# Patient Record
Sex: MALE | Race: WHITE | Employment: UNEMPLOYED | URBAN - METROPOLITAN AREA
[De-identification: names, ages, dates, MRNs, and addresses within clinical notes are randomized per-mention and may not be internally consistent; named-entity substitution may affect disease eponyms.]

---

## 2020-09-08 PROBLEM — F17.200 SMOKER: Status: ACTIVE | Noted: 2020-09-08

## 2020-09-08 PROBLEM — Z78.9 ALCOHOL CONSUMPTION HEAVY: Status: ACTIVE | Noted: 2020-09-08

## 2020-09-08 PROBLEM — E66.9 OBESITY (BMI 30.0-34.9): Status: ACTIVE | Noted: 2020-09-08

## 2020-09-08 PROBLEM — I10 ESSENTIAL HYPERTENSION: Status: ACTIVE | Noted: 2020-09-08

## 2021-12-04 PROBLEM — R63.4 UNINTENTIONAL WEIGHT LOSS: Status: ACTIVE | Noted: 2021-12-04

## 2024-01-30 ENCOUNTER — ANESTHESIA EVENT (OUTPATIENT)
Dept: SURGERY | Facility: HOSPITAL | Age: 59
End: 2024-01-30
Payer: COMMERCIAL

## 2024-01-31 NOTE — H&P (VIEW-ONLY)
Patient ID: Demarcus Jaramillo     Chief Complaint:    Left hip pain       HPI:    Demarcus Jaramillo is a 58 year old male who presents today for left hip OA/pain. Seen in consultation with Dr Foreman and a note will be sent. Patient states pain started >2 years ago. Pain is severe and described as aching, grinding, and sharp,  and groin pain. Pain is located anteriorly. Factors that aggravate symptoms include: getting in and out of car, putting shoes and socks on activity, stair climbing, getting up from a chair.  Weight Bearing: without assist.  Patient denies any numbness, tingles, or radicular symptoms. Patient denies any signs of infection including fever or chills.  Patient states that the symptoms are getting progressively worse.  The pain is affecting their quality of life, ability to sleep at night, ability to perform activities and daily living and to ambulate.  Pain with walking and stair climbing.  They report they do walk with a limp.  Patient does note stiffness in the hip.  Patient has taken an anti-inflammatory for months.  Patient does not walk with a cane.  Patient does not take pain medications.  Patient would like to discuss surgery as he was unable to proceed previously. Now using a cane for ambulation.  Pain has continued and here to discuss surgical options.          Physical Exam:     Vital Signs:  There were no vitals taken for this visit.    Past Medical History:   Diagnosis Date   • Essential hypertension      Past Surgical History:   Procedure Laterality Date   • COLONOSCOPY       Current Outpatient Medications   Medication Sig Dispense Refill   • LOSARTAN-HYDROCHLOROTHIAZIDE 50-12.5 MG Oral Tab TAKE 1 TABLET BY MOUTH TWICE DAILY 180 tablet 0     No Known Allergies  Social History    Tobacco Use      Smoking status: Some Days        Packs/day: 1.00        Years: 30.00        Additional pack years: 0.00        Total pack years: 30.00        Types: Cigarettes        Passive exposure: Never       Smokeless tobacco: Never    Alcohol use: Yes      Alcohol/week: 3.0 standard drinks of alcohol      Types: 3 Cans of beer per week    History reviewed. No pertinent family history.    ROS:     All other pertinent positives and negatives as noted above in HPI.    Physical Exam  Vital Signs:  There were no vitals taken for this visit.  Estimated body mass index is 32.49 kg/m² as calculated from the following:    Height as of 1/8/24: 5' 9\" (1.753 m).    Weight as of 1/8/24: 220 lb (99.8 kg).    antalgic gait  without assistive device    Right hip: Limited internal (15 degrees) and external rotation (25 degrees) with pain in the groin region, Limited abduction (0-15/20) degrees, adduction (15 degrees) and flexion ( -5-95 degrees)         5/5 strength  5/5 knee flexion and extension  5/5 ankle dorsiflexion and plantarflexion  Sensation grossly intact in thigh, leg and foot  Negative Trendelenburg sign  Negative Stinchfield sign  2+ pedal pulses and brisk capillary refill  No skin rashes or lesion noted    Left hip: Limited internal (0 degrees) and external rotation (15 degrees) with pain in the groin region, Limited abduction (0-15/20) degrees, adduction (15 degrees) and flexion ( -5-95 degrees)      5/5 strength  5/5 knee flexion and extension  5/5 ankle dorsiflexion and plantarflexion  Sensation grossly intact in thigh, leg and foot  Negative Trendelenburg sign  Negative Stinchfield sign  2+ pedal pulses and brisk capillary refill  No skin rashes or lesions noted    Back:  Deformity: no  Pelvic obliquity: no  Tenderness: no        Radiographs:    Imaging was personally and individually reviewed and discussed at length with the patient:      Three views of the left hip(s) were reviewed in the office today, including AP pelvis and hip AP and lateral views.  There is severe joint space narrowing (bone-on-bone) with large osteophyte formation off the acetabulum and the head neck junction.  There is sclerosis noted in  the femoral head as well as a large cystic region with collapse and acetabulum. There is no fracture or dislocation.  No periosteal reactions or medullary lesions are seen.      MRSA/MSSA - Negative  Hgb 15.8  Alb 4.7  Cr 0.78  GFR 98        Assessment:     left hip osteoarthritis with possible AVN with collapse            Plan:       Continuation of conservative management vs. CALISTA discussed.  The patient wishes to proceed with left total hip replacement.      One or more of the conservative treatments have been tried and failed for three months or more except in special circumstances where delay of definitive care is not appropriate: non steroid anti-infammatory medication(s) and activity modification. Unlikely to provide significant relief with injections given severity.      Risk and benefits of surgery were reviewed.  Including, but not limited to, blood clots, anesthesia risk, infection, leg length discrepancy, failure of the implant, need for future surgeries, continued pain, hematoma, need for transfusion, and death, among others.  The patient understands and wishes to proceed.     The spectrum of treatment options were discussed with the patient in detail including both the nonoperative and operative treatment modalities and their respective risks and benefits.  After thorough discussion, the patient has elected to undergo surgical treatment.  The details of the surgical procedure were explained including the location of probable incisions and a description of the likely implants to be used.  Models and diagrams were used as educational resources. The patient understands the likely convalescence after surgery, as well as the rehabilitation required.  We thoroughly discussed the risks, benefits, and alternatives to surgery.  The risks include but are not limited to the risk of infection, joint stiffness, blood clots (including DVT and/or pulmonary embolus along with the risk of death), neurologic and/or  vascular injury, fracture, dislocation, nonunion, malunion, need for further surgery including hardware failure requiring revision, and continued pain.  It was explained that if tissue has been repaired or reconstructed, there is also a chance of failure which may require further management.  In addition, we have discussed the risks, including the risk of disease transmission, associated with any allograft tissue which may be utilized.  Following the completion of the discussion, the patient expressed understanding of this planned course of care, all their questions were answered and consent will be obtained preoperatively.    The risks, benefits and alternatives of surgery were discussed with the patient including, but not limited to:   You may be allergic to the medicine that you get during surgery.   The nerves or tendons around your hip may be hurt during surgery.   You may bleed more than expected, requiring blood transfusion.  You may get an infection which will require additional surgery and possibly removal of all implants to cure. Patients with diabetes or who are on certain medications to suppress the immune system are particularly at risk for infection. Patients over 40 BMI are at significant risk for infection and wound healing problems.  You could have a fracture during surgery, or within several weeks after surgery. Patients with osteopenia or osteoporosis are at increased risk for fracture during and after surgery.  You may have problems with your heart and/or kidneys. Patients with history of heart disease are at increased risk for cardiac complications and some of the medications used during and after surgery may affect kidney function.  After surgery, your hip may be stiff and painful. Your hip and knee may swell. This usually lasts about 6 weeks and may be permanent.  Your legs may not be the same length.   Your implant may get loose or move out of place causing pain.  The implant may get worn out  over time and need to be replaced.   After having surgery, you may lose your balance and be more likely to fall for a time.   You can dislocate your hip which would require an additional procedure and potentially surgery to correct.  You may get a blood clot in your leg or arm. This can cause pain and swelling, and it can stop blood from flowing where it needs to go in your body. The blood clot can break loose and travel to your lungs or brain. A blood clot in your lungs can cause chest pain, trouble breathing and possibly death. A blood clot in your brain can cause a stroke. These problems can be life-threatening.       Pain medication discussed.    Edw obs    Previous smoking and quit smoking for 6 weeks already and knows should stop for at least 6 weeks after surgery as well to minimize risk of infection and complications.   no diabetes - Last A1c value was 5.6% done 12/4/2021.  BMI - 30  no YOGESH  no hx of infections/MRSA/dental/joint  no hx of blood clots   no blood thinner  No last injection  no lumbar surgery  no allergy  No Cardiac history  No Pulmonary history       - risks, benefits and alternatives discussed  - labs reviewed and meds given including ppx abx  - proceed with left total hip arthroplasty       Diagnoses and all orders for this visit:    Primary osteoarthritis of left hip

## 2024-02-05 ENCOUNTER — ANESTHESIA (OUTPATIENT)
Dept: SURGERY | Facility: HOSPITAL | Age: 59
End: 2024-02-05
Payer: COMMERCIAL

## 2024-02-05 ENCOUNTER — HOSPITAL ENCOUNTER (OUTPATIENT)
Facility: HOSPITAL | Age: 59
Discharge: HOME HEALTH | End: 2024-02-06
Attending: ORTHOPAEDIC SURGERY | Admitting: ORTHOPAEDIC SURGERY
Payer: COMMERCIAL

## 2024-02-05 ENCOUNTER — APPOINTMENT (OUTPATIENT)
Dept: GENERAL RADIOLOGY | Facility: HOSPITAL | Age: 59
End: 2024-02-05
Attending: ORTHOPAEDIC SURGERY
Payer: COMMERCIAL

## 2024-02-05 LAB
ANION GAP SERPL CALC-SCNC: 4 MMOL/L (ref 0–18)
ANTIBODY SCREEN: NEGATIVE
BUN BLD-MCNC: 9 MG/DL (ref 9–23)
CALCIUM BLD-MCNC: 9.3 MG/DL (ref 8.5–10.1)
CHLORIDE SERPL-SCNC: 97 MMOL/L (ref 98–112)
CO2 SERPL-SCNC: 31 MMOL/L (ref 21–32)
CREAT BLD-MCNC: 0.86 MG/DL
EGFRCR SERPLBLD CKD-EPI 2021: 99 ML/MIN/1.73M2 (ref 60–?)
GLUCOSE BLD-MCNC: 109 MG/DL (ref 70–99)
OSMOLALITY SERPL CALC.SUM OF ELEC: 273 MOSM/KG (ref 275–295)
POTASSIUM SERPL-SCNC: 3.8 MMOL/L (ref 3.5–5.1)
RH BLOOD TYPE: POSITIVE
SODIUM SERPL-SCNC: 132 MMOL/L (ref 136–145)

## 2024-02-05 PROCEDURE — 86850 RBC ANTIBODY SCREEN: CPT | Performed by: ORTHOPAEDIC SURGERY

## 2024-02-05 PROCEDURE — 86901 BLOOD TYPING SEROLOGIC RH(D): CPT | Performed by: ORTHOPAEDIC SURGERY

## 2024-02-05 PROCEDURE — 97161 PT EVAL LOW COMPLEX 20 MIN: CPT

## 2024-02-05 PROCEDURE — 97116 GAIT TRAINING THERAPY: CPT

## 2024-02-05 PROCEDURE — 0SRB039 REPLACEMENT OF LEFT HIP JOINT WITH CERAMIC SYNTHETIC SUBSTITUTE, CEMENTED, OPEN APPROACH: ICD-10-PCS | Performed by: ORTHOPAEDIC SURGERY

## 2024-02-05 PROCEDURE — 76000 FLUOROSCOPY <1 HR PHYS/QHP: CPT | Performed by: ORTHOPAEDIC SURGERY

## 2024-02-05 PROCEDURE — 88304 TISSUE EXAM BY PATHOLOGIST: CPT | Performed by: ORTHOPAEDIC SURGERY

## 2024-02-05 PROCEDURE — 86900 BLOOD TYPING SEROLOGIC ABO: CPT | Performed by: ORTHOPAEDIC SURGERY

## 2024-02-05 PROCEDURE — 73501 X-RAY EXAM HIP UNI 1 VIEW: CPT | Performed by: ORTHOPAEDIC SURGERY

## 2024-02-05 PROCEDURE — 80048 BASIC METABOLIC PNL TOTAL CA: CPT | Performed by: ANESTHESIOLOGY

## 2024-02-05 PROCEDURE — 88311 DECALCIFY TISSUE: CPT | Performed by: ORTHOPAEDIC SURGERY

## 2024-02-05 DEVICE — IMPLANTABLE DEVICE: Type: IMPLANTABLE DEVICE | Site: HIP | Status: FUNCTIONAL

## 2024-02-05 DEVICE — SHELL ACET SZ G DIA58MM HIP PPS LIMIT H G7: Type: IMPLANTABLE DEVICE | Site: HIP | Status: FUNCTIONAL

## 2024-02-05 RX ORDER — CELECOXIB 200 MG/1
200 CAPSULE ORAL DAILY
COMMUNITY
Start: 2024-01-31

## 2024-02-05 RX ORDER — ACETAMINOPHEN 325 MG/1
650 TABLET ORAL EVERY 4 HOURS
COMMUNITY
Start: 2024-01-31 | End: 2024-02-14

## 2024-02-05 RX ORDER — PROCHLORPERAZINE EDISYLATE 5 MG/ML
5 INJECTION INTRAMUSCULAR; INTRAVENOUS EVERY 8 HOURS PRN
Status: DISCONTINUED | OUTPATIENT
Start: 2024-02-05 | End: 2024-02-05 | Stop reason: HOSPADM

## 2024-02-05 RX ORDER — KETAMINE HYDROCHLORIDE 50 MG/ML
INJECTION, SOLUTION INTRAMUSCULAR; INTRAVENOUS AS NEEDED
Status: DISCONTINUED | OUTPATIENT
Start: 2024-02-05 | End: 2024-02-05 | Stop reason: SURG

## 2024-02-05 RX ORDER — DIPHENHYDRAMINE HYDROCHLORIDE 50 MG/ML
25 INJECTION INTRAMUSCULAR; INTRAVENOUS ONCE AS NEEDED
Status: ACTIVE | OUTPATIENT
Start: 2024-02-05 | End: 2024-02-05

## 2024-02-05 RX ORDER — OXYCODONE HYDROCHLORIDE 5 MG/1
5 TABLET ORAL EVERY 4 HOURS PRN
Status: DISCONTINUED | OUTPATIENT
Start: 2024-02-05 | End: 2024-02-06

## 2024-02-05 RX ORDER — MELATONIN
325
Status: DISCONTINUED | OUTPATIENT
Start: 2024-02-06 | End: 2024-02-06

## 2024-02-05 RX ORDER — DEXAMETHASONE SODIUM PHOSPHATE 10 MG/ML
8 INJECTION, SOLUTION INTRAMUSCULAR; INTRAVENOUS ONCE
Status: COMPLETED | OUTPATIENT
Start: 2024-02-06 | End: 2024-02-06

## 2024-02-05 RX ORDER — FAMOTIDINE 20 MG/1
20 TABLET, FILM COATED ORAL 2 TIMES DAILY
Status: DISCONTINUED | OUTPATIENT
Start: 2024-02-05 | End: 2024-02-06

## 2024-02-05 RX ORDER — MIDAZOLAM HYDROCHLORIDE 1 MG/ML
1 INJECTION INTRAMUSCULAR; INTRAVENOUS EVERY 5 MIN PRN
Status: DISCONTINUED | OUTPATIENT
Start: 2024-02-05 | End: 2024-02-05 | Stop reason: HOSPADM

## 2024-02-05 RX ORDER — MEPERIDINE HYDROCHLORIDE 25 MG/ML
12.5 INJECTION INTRAMUSCULAR; INTRAVENOUS; SUBCUTANEOUS AS NEEDED
Status: DISCONTINUED | OUTPATIENT
Start: 2024-02-05 | End: 2024-02-05 | Stop reason: HOSPADM

## 2024-02-05 RX ORDER — TIZANIDINE 2 MG/1
2 TABLET ORAL EVERY 6 HOURS PRN
Status: DISCONTINUED | OUTPATIENT
Start: 2024-02-05 | End: 2024-02-06

## 2024-02-05 RX ORDER — HYDROCODONE BITARTRATE AND ACETAMINOPHEN 5; 325 MG/1; MG/1
2 TABLET ORAL ONCE AS NEEDED
Status: DISCONTINUED | OUTPATIENT
Start: 2024-02-05 | End: 2024-02-05 | Stop reason: HOSPADM

## 2024-02-05 RX ORDER — DOCUSATE SODIUM 100 MG/1
100 CAPSULE, LIQUID FILLED ORAL 2 TIMES DAILY
Status: DISCONTINUED | OUTPATIENT
Start: 2024-02-05 | End: 2024-02-06

## 2024-02-05 RX ORDER — ASPIRIN 81 MG/1
81 TABLET ORAL 2 TIMES DAILY
Status: DISCONTINUED | OUTPATIENT
Start: 2024-02-05 | End: 2024-02-06

## 2024-02-05 RX ORDER — ACETAMINOPHEN 325 MG/1
650 TABLET ORAL ONCE
Status: COMPLETED | OUTPATIENT
Start: 2024-02-05 | End: 2024-02-05

## 2024-02-05 RX ORDER — TRANEXAMIC ACID 10 MG/ML
1000 INJECTION, SOLUTION INTRAVENOUS ONCE
Status: COMPLETED | OUTPATIENT
Start: 2024-02-05 | End: 2024-02-05

## 2024-02-05 RX ORDER — PREGABALIN 75 MG/1
75 CAPSULE ORAL DAILY
Status: DISCONTINUED | OUTPATIENT
Start: 2024-02-05 | End: 2024-02-06

## 2024-02-05 RX ORDER — TRAMADOL HYDROCHLORIDE 50 MG/1
1 TABLET ORAL
COMMUNITY
Start: 2024-01-31

## 2024-02-05 RX ORDER — SENNOSIDES 8.6 MG
17.2 TABLET ORAL NIGHTLY
Status: DISCONTINUED | OUTPATIENT
Start: 2024-02-05 | End: 2024-02-06

## 2024-02-05 RX ORDER — HYDROMORPHONE HYDROCHLORIDE 1 MG/ML
0.8 INJECTION, SOLUTION INTRAMUSCULAR; INTRAVENOUS; SUBCUTANEOUS EVERY 2 HOUR PRN
Status: DISCONTINUED | OUTPATIENT
Start: 2024-02-05 | End: 2024-02-06

## 2024-02-05 RX ORDER — NALOXONE HYDROCHLORIDE 0.4 MG/ML
0.08 INJECTION, SOLUTION INTRAMUSCULAR; INTRAVENOUS; SUBCUTANEOUS AS NEEDED
Status: DISCONTINUED | OUTPATIENT
Start: 2024-02-05 | End: 2024-02-05 | Stop reason: HOSPADM

## 2024-02-05 RX ORDER — ONDANSETRON 2 MG/ML
4 INJECTION INTRAMUSCULAR; INTRAVENOUS EVERY 6 HOURS PRN
Status: DISCONTINUED | OUTPATIENT
Start: 2024-02-05 | End: 2024-02-06

## 2024-02-05 RX ORDER — SODIUM CHLORIDE, SODIUM LACTATE, POTASSIUM CHLORIDE, CALCIUM CHLORIDE 600; 310; 30; 20 MG/100ML; MG/100ML; MG/100ML; MG/100ML
INJECTION, SOLUTION INTRAVENOUS CONTINUOUS
Status: DISCONTINUED | OUTPATIENT
Start: 2024-02-05 | End: 2024-02-05 | Stop reason: HOSPADM

## 2024-02-05 RX ORDER — ACETAMINOPHEN 500 MG
1000 TABLET ORAL ONCE
Status: DISCONTINUED | OUTPATIENT
Start: 2024-02-05 | End: 2024-02-05 | Stop reason: HOSPADM

## 2024-02-05 RX ORDER — MIDAZOLAM HYDROCHLORIDE 1 MG/ML
INJECTION INTRAMUSCULAR; INTRAVENOUS AS NEEDED
Status: DISCONTINUED | OUTPATIENT
Start: 2024-02-05 | End: 2024-02-05 | Stop reason: SURG

## 2024-02-05 RX ORDER — DIPHENHYDRAMINE HCL 25 MG
25 CAPSULE ORAL EVERY 4 HOURS PRN
Status: DISCONTINUED | OUTPATIENT
Start: 2024-02-05 | End: 2024-02-06

## 2024-02-05 RX ORDER — CEPHALEXIN 500 MG/1
500 CAPSULE ORAL 2 TIMES DAILY
COMMUNITY
Start: 2024-01-31 | End: 2024-02-10

## 2024-02-05 RX ORDER — ACETAMINOPHEN 500 MG
1000 TABLET ORAL ONCE AS NEEDED
Status: DISCONTINUED | OUTPATIENT
Start: 2024-02-05 | End: 2024-02-05 | Stop reason: HOSPADM

## 2024-02-05 RX ORDER — OXYCODONE HYDROCHLORIDE 10 MG/1
10 TABLET ORAL EVERY 4 HOURS PRN
Status: DISCONTINUED | OUTPATIENT
Start: 2024-02-05 | End: 2024-02-06

## 2024-02-05 RX ORDER — ACETAMINOPHEN 325 MG/1
650 TABLET ORAL
Status: DISCONTINUED | OUTPATIENT
Start: 2024-02-05 | End: 2024-02-06

## 2024-02-05 RX ORDER — HYDROMORPHONE HYDROCHLORIDE 1 MG/ML
0.2 INJECTION, SOLUTION INTRAMUSCULAR; INTRAVENOUS; SUBCUTANEOUS EVERY 5 MIN PRN
Status: DISCONTINUED | OUTPATIENT
Start: 2024-02-05 | End: 2024-02-05 | Stop reason: HOSPADM

## 2024-02-05 RX ORDER — PHENYLEPHRINE HCL 10 MG/ML
VIAL (ML) INJECTION AS NEEDED
Status: DISCONTINUED | OUTPATIENT
Start: 2024-02-05 | End: 2024-02-05 | Stop reason: SURG

## 2024-02-05 RX ORDER — SODIUM CHLORIDE, SODIUM LACTATE, POTASSIUM CHLORIDE, CALCIUM CHLORIDE 600; 310; 30; 20 MG/100ML; MG/100ML; MG/100ML; MG/100ML
INJECTION, SOLUTION INTRAVENOUS CONTINUOUS
Status: DISCONTINUED | OUTPATIENT
Start: 2024-02-05 | End: 2024-02-06

## 2024-02-05 RX ORDER — ASPIRIN 81 MG/1
TABLET ORAL
COMMUNITY
Start: 2024-01-31

## 2024-02-05 RX ORDER — HYDROMORPHONE HYDROCHLORIDE 1 MG/ML
0.4 INJECTION, SOLUTION INTRAMUSCULAR; INTRAVENOUS; SUBCUTANEOUS EVERY 2 HOUR PRN
Status: DISCONTINUED | OUTPATIENT
Start: 2024-02-05 | End: 2024-02-06

## 2024-02-05 RX ORDER — POLYETHYLENE GLYCOL 3350 17 G/17G
17 POWDER, FOR SOLUTION ORAL DAILY PRN
Status: DISCONTINUED | OUTPATIENT
Start: 2024-02-05 | End: 2024-02-06

## 2024-02-05 RX ORDER — ONDANSETRON 2 MG/ML
4 INJECTION INTRAMUSCULAR; INTRAVENOUS EVERY 6 HOURS PRN
Status: DISCONTINUED | OUTPATIENT
Start: 2024-02-05 | End: 2024-02-05 | Stop reason: HOSPADM

## 2024-02-05 RX ORDER — LIDOCAINE HYDROCHLORIDE 10 MG/ML
INJECTION, SOLUTION EPIDURAL; INFILTRATION; INTRACAUDAL; PERINEURAL AS NEEDED
Status: DISCONTINUED | OUTPATIENT
Start: 2024-02-05 | End: 2024-02-05 | Stop reason: SURG

## 2024-02-05 RX ORDER — HYDROMORPHONE HYDROCHLORIDE 1 MG/ML
0.4 INJECTION, SOLUTION INTRAMUSCULAR; INTRAVENOUS; SUBCUTANEOUS EVERY 5 MIN PRN
Status: DISCONTINUED | OUTPATIENT
Start: 2024-02-05 | End: 2024-02-05 | Stop reason: HOSPADM

## 2024-02-05 RX ORDER — BISACODYL 10 MG
10 SUPPOSITORY, RECTAL RECTAL
Status: DISCONTINUED | OUTPATIENT
Start: 2024-02-05 | End: 2024-02-06

## 2024-02-05 RX ORDER — LABETALOL HYDROCHLORIDE 5 MG/ML
5 INJECTION, SOLUTION INTRAVENOUS EVERY 5 MIN PRN
Status: DISCONTINUED | OUTPATIENT
Start: 2024-02-05 | End: 2024-02-05 | Stop reason: HOSPADM

## 2024-02-05 RX ORDER — ENEMA 19; 7 G/133ML; G/133ML
1 ENEMA RECTAL ONCE AS NEEDED
Status: DISCONTINUED | OUTPATIENT
Start: 2024-02-05 | End: 2024-02-06

## 2024-02-05 RX ORDER — FAMOTIDINE 10 MG/ML
20 INJECTION, SOLUTION INTRAVENOUS 2 TIMES DAILY
Status: DISCONTINUED | OUTPATIENT
Start: 2024-02-05 | End: 2024-02-06

## 2024-02-05 RX ORDER — TRANEXAMIC ACID 10 MG/ML
1000 INJECTION, SOLUTION INTRAVENOUS ONCE
Status: DISCONTINUED | OUTPATIENT
Start: 2024-02-05 | End: 2024-02-05 | Stop reason: HOSPADM

## 2024-02-05 RX ORDER — PREGABALIN 75 MG/1
75 CAPSULE ORAL DAILY
COMMUNITY
Start: 2024-01-31

## 2024-02-05 RX ORDER — ONDANSETRON 2 MG/ML
INJECTION INTRAMUSCULAR; INTRAVENOUS AS NEEDED
Status: DISCONTINUED | OUTPATIENT
Start: 2024-02-05 | End: 2024-02-05 | Stop reason: SURG

## 2024-02-05 RX ORDER — DEXAMETHASONE SODIUM PHOSPHATE 4 MG/ML
VIAL (ML) INJECTION AS NEEDED
Status: DISCONTINUED | OUTPATIENT
Start: 2024-02-05 | End: 2024-02-05 | Stop reason: SURG

## 2024-02-05 RX ORDER — OXYCODONE HYDROCHLORIDE 5 MG/1
1 TABLET ORAL EVERY 4 HOURS PRN
COMMUNITY
Start: 2024-01-31

## 2024-02-05 RX ORDER — KETOROLAC TROMETHAMINE 30 MG/ML
30 INJECTION, SOLUTION INTRAMUSCULAR; INTRAVENOUS EVERY 6 HOURS
Status: DISCONTINUED | OUTPATIENT
Start: 2024-02-05 | End: 2024-02-06

## 2024-02-05 RX ORDER — HYDROCODONE BITARTRATE AND ACETAMINOPHEN 5; 325 MG/1; MG/1
1 TABLET ORAL ONCE AS NEEDED
Status: DISCONTINUED | OUTPATIENT
Start: 2024-02-05 | End: 2024-02-05 | Stop reason: HOSPADM

## 2024-02-05 RX ORDER — ACETAMINOPHEN 325 MG/1
TABLET ORAL
Status: COMPLETED
Start: 2024-02-05 | End: 2024-02-05

## 2024-02-05 RX ORDER — HYDROMORPHONE HYDROCHLORIDE 1 MG/ML
0.6 INJECTION, SOLUTION INTRAMUSCULAR; INTRAVENOUS; SUBCUTANEOUS EVERY 5 MIN PRN
Status: DISCONTINUED | OUTPATIENT
Start: 2024-02-05 | End: 2024-02-05 | Stop reason: HOSPADM

## 2024-02-05 RX ORDER — DIPHENHYDRAMINE HYDROCHLORIDE 50 MG/ML
12.5 INJECTION INTRAMUSCULAR; INTRAVENOUS AS NEEDED
Status: DISCONTINUED | OUTPATIENT
Start: 2024-02-05 | End: 2024-02-05 | Stop reason: HOSPADM

## 2024-02-05 RX ORDER — CEFAZOLIN SODIUM/WATER 2 G/20 ML
2 SYRINGE (ML) INTRAVENOUS EVERY 8 HOURS
Qty: 40 ML | Refills: 0 | Status: COMPLETED | OUTPATIENT
Start: 2024-02-05 | End: 2024-02-06

## 2024-02-05 RX ORDER — DIPHENHYDRAMINE HYDROCHLORIDE 50 MG/ML
12.5 INJECTION INTRAMUSCULAR; INTRAVENOUS EVERY 4 HOURS PRN
Status: DISCONTINUED | OUTPATIENT
Start: 2024-02-05 | End: 2024-02-06

## 2024-02-05 RX ORDER — CEFAZOLIN SODIUM/WATER 2 G/20 ML
2 SYRINGE (ML) INTRAVENOUS ONCE
Status: COMPLETED | OUTPATIENT
Start: 2024-02-05 | End: 2024-02-05

## 2024-02-05 RX ORDER — BUPIVACAINE HYDROCHLORIDE 7.5 MG/ML
INJECTION, SOLUTION INTRASPINAL AS NEEDED
Status: DISCONTINUED | OUTPATIENT
Start: 2024-02-05 | End: 2024-02-05 | Stop reason: SURG

## 2024-02-05 RX ORDER — AMOXICILLIN 250 MG
1 CAPSULE ORAL DAILY
COMMUNITY
Start: 2024-01-31

## 2024-02-05 RX ORDER — PROCHLORPERAZINE EDISYLATE 5 MG/ML
5 INJECTION INTRAMUSCULAR; INTRAVENOUS EVERY 8 HOURS PRN
Status: DISCONTINUED | OUTPATIENT
Start: 2024-02-05 | End: 2024-02-06

## 2024-02-05 RX ADMIN — BUPIVACAINE HYDROCHLORIDE 1.8 ML: 7.5 INJECTION, SOLUTION INTRASPINAL at 10:30:00

## 2024-02-05 RX ADMIN — SODIUM CHLORIDE, SODIUM LACTATE, POTASSIUM CHLORIDE, CALCIUM CHLORIDE: 600; 310; 30; 20 INJECTION, SOLUTION INTRAVENOUS at 10:19:00

## 2024-02-05 RX ADMIN — PHENYLEPHRINE HCL 100 MCG: 10 MG/ML VIAL (ML) INJECTION at 11:24:00

## 2024-02-05 RX ADMIN — CEFAZOLIN SODIUM/WATER 2 G: 2 G/20 ML SYRINGE (ML) INTRAVENOUS at 10:30:00

## 2024-02-05 RX ADMIN — TRANEXAMIC ACID 1000 MG: 10 INJECTION, SOLUTION INTRAVENOUS at 10:35:00

## 2024-02-05 RX ADMIN — PHENYLEPHRINE HCL 100 MCG: 10 MG/ML VIAL (ML) INJECTION at 12:00:00

## 2024-02-05 RX ADMIN — PHENYLEPHRINE HCL 100 MCG: 10 MG/ML VIAL (ML) INJECTION at 12:04:00

## 2024-02-05 RX ADMIN — KETAMINE HYDROCHLORIDE 50 MG: 50 INJECTION, SOLUTION INTRAMUSCULAR; INTRAVENOUS at 10:31:00

## 2024-02-05 RX ADMIN — ONDANSETRON 4 MG: 2 INJECTION INTRAMUSCULAR; INTRAVENOUS at 10:57:00

## 2024-02-05 RX ADMIN — LIDOCAINE HYDROCHLORIDE 5 ML: 10 INJECTION, SOLUTION EPIDURAL; INFILTRATION; INTRACAUDAL; PERINEURAL at 10:31:00

## 2024-02-05 RX ADMIN — PHENYLEPHRINE HCL 50 MCG: 10 MG/ML VIAL (ML) INJECTION at 11:20:00

## 2024-02-05 RX ADMIN — DEXAMETHASONE SODIUM PHOSPHATE 4 MG: 4 MG/ML VIAL (ML) INJECTION at 10:35:00

## 2024-02-05 RX ADMIN — SODIUM CHLORIDE, SODIUM LACTATE, POTASSIUM CHLORIDE, CALCIUM CHLORIDE: 600; 310; 30; 20 INJECTION, SOLUTION INTRAVENOUS at 12:12:00

## 2024-02-05 RX ADMIN — MIDAZOLAM HYDROCHLORIDE 2 MG: 1 INJECTION INTRAMUSCULAR; INTRAVENOUS at 10:19:00

## 2024-02-05 RX ADMIN — PHENYLEPHRINE HCL 50 MCG: 10 MG/ML VIAL (ML) INJECTION at 11:01:00

## 2024-02-05 NOTE — INTERVAL H&P NOTE
Pre-op Diagnosis: PRIMARY OSTEOARTHRITIS LEFT HIP    The above referenced H&P was reviewed by Keshawn Moura MD on 2/5/2024, the patient was examined and no significant changes have occurred in the patient's condition since the H&P was performed.  I discussed with the patient and/or legal representative the potential benefits, risks and side effects of this procedure; the likelihood of the patient achieving goals; and potential problems that might occur during recuperation.  I discussed reasonable alternatives to the procedure, including risks, benefits and side effects related to the alternatives and risks related to not receiving this procedure.  We will proceed with procedure as planned.

## 2024-02-05 NOTE — PHYSICAL THERAPY NOTE
PHYSICAL THERAPY JOINT EVALUATION - INPATIENT     Room Number: 368/368-A  Evaluation Date: 2/5/2024  Type of Evaluation: Initial  Physician Order: PT Eval and Treat    Presenting Problem: s/p L CALISTA  Co-Morbidities : essential HTN, ETOH  Reason for Therapy: Mobility Dysfunction and Discharge Planning    History related to current admission: Patient is a 58 year old male admitted on 2/5/2024 for L CALISTA with Dr. Moura.    Procedure Performed 2/5/24:  LEFT TOTAL HIP ARTHROPLASTY  ASSESSMENT   In this PT evaluation, the patient presents with the following impairments incr pain/discomfort of LLE, decr ROM and strength of LLE, decr static and dynamic standing balance, decr activity tolerance/endurance, decr functional mobility.  These impairments and comorbidities manifest themselves as functional limitations in independent bed mobility, transfers, and gait.  The patient is below baseline and would benefit from skilled inpatient PT to address the above deficits to assist patient in returning to prior to level of function.  Functional outcome measures completed include AMPAC.  The AM-PAC '6-Clicks' Inpatient Basic Mobility Short Form was completed and this patient is demonstrating a Approx Degree of Impairment: 46.58%  degree of impairment in mobility. Research supports that patients with this level of impairment may benefit from HHPT  DISCHARGE RECOMMENDATIONS  PT Discharge Recommendations: Home with home health PT    PLAN  PT Treatment Plan: Bed mobility;Body mechanics;Coordination;Endurance;Energy conservation;Patient education;Family education;Gait training;Neuromuscular re-educate;Range of motion;Strengthening;Stoop training;Stair training;Transfer training;Balance training  Rehab Potential : Good  Frequency (Obs): Daily  Number of Visits to Meet Established Goals: 2      Goal #1  Patient is able to demonstrate supine - sit EOB @ level: supervision     Goal #2  Patient is able to demonstrate transfers Sit  to/from Stand at assistance level: supervison       Goal #3    Patient is able to ambulate 150 feet with assistive device at assistance level: supervision   Goal #4    Patient will negotiate 4 stairs/one curb w/ assistive device and supervision   Goal #5    Patient verbalizes and/or demonstrates all precautions and safety concerns independently    Goal #6      Goal Comments: Goals established on 2024    HOME SITUATION  Type of Home: Other (Comment) ([place])   Home Layout: One level  Stairs to Enter : 4  Railing: Yes          Lives With: Alone (Sister plans to stay with pt for a few days after dc (she also lives right next door))  Drives: Yes  Patient Owned Equipment: Rolling walker;Cane  Patient Regularly Uses: Reading glasses    Prior Level of [place]: Per pt - [place] alone in [place] home. Juan with mobility- ambulates with cane in home/community. Owns RW, does not use.  around the area. Sister plans to stay with him at time of dc for a few days- she also lives right next door to patient. No hx of falls.    SUBJECTIVE  \"I have to call the nurse everytime I want to get up?\"      OBJECTIVE  Precautions: Bed/chair alarm  Fall Risk: Standard fall risk    WEIGHT BEARING RESTRICTION  Weight Bearing Restriction: L lower extremity           L Lower Extremity: Weight Bearing as Tolerated    PAIN ASSESSMENT  Ratin  Location: left hip  Management Techniques: Activity promotion;Body mechanics;Repositioning    COGNITION  Overall Cognitive Status:  WFL - within functional limits    RANGE OF MOTION AND STRENGTH ASSESSMENT  Upper extremity ROM and strength are within functional limits     Lower extremity ROM is within functional limits     Lower extremity strength is within functional limits       BALANCE  Static Sitting: Good  Dynamic Sitting: Fair +  Static Standing: Fair -  Dynamic Standing: Fair -    ADDITIONAL TESTS                                    ACTIVITY TOLERANCE                         O2  WALK       NEUROLOGICAL FINDINGS                        AM-PAC '6-Clicks' INPATIENT SHORT FORM - BASIC MOBILITY  How much difficulty does the patient currently have...  Patient Difficulty: Turning over in bed (including adjusting bedclothes, sheets and blankets)?: A Little   Patient Difficulty: Sitting down on and standing up from a chair with arms (e.g., wheelchair, bedside commode, etc.): A Little   Patient Difficulty: Moving from lying on back to sitting on the side of the bed?: A Little   How much help from another person does the patient currently need...   Help from Another: Moving to and from a bed to a chair (including a wheelchair)?: A Little   Help from Another: Need to walk in hospital room?: A Little   Help from Another: Climbing 3-5 steps with a railing?: A Little       AM-PAC Score:  Raw Score: 18   Approx Degree of Impairment: 46.58%   Standardized Score (AM-PAC Scale): 43.63   CMS Modifier (G-Code): CK    FUNCTIONAL ABILITY STATUS  Gait Assessment   Functional Mobility/Gait Assessment  Gait Assistance: Contact guard assist  Distance (ft): 50  Assistive Device: Rolling walker  Pattern: Within Functional Limits (step through)    Skilled Therapy Provided    Bed Mobility:  Rolling: NT  Supine to sit: w/ HOB elevated- supervision   Sit to supine: NT     Transfer Mobility:  Sit to stand: supervision to RW- v/c for hand placement   Stand to sit: supervision  Gait = CGA w/ RW x 50 feet, step through pattern.     Therapist's Comments:  Patient presents sitting up in bed. Discussed role and goal of physical therapy POD0 CALISTA. Pt in agreement to session. Reports 4/10 pain at thi time. Educated on WBAT to LLE. Pt verbalizes understanding.  Bed mobility and transfers at supervision. Ambulated 50 feet w/ RW at CGA.   Educated on importance of continued out of bed mobility and encouraged continued ambulation with nursing staff. Pt verbalizes understanding.     Exercise/Education Provided:  Bed mobility  Body  mechanics  Energy conservation  Functional activity tolerated  Gait training  Posture  Transfer training    Patient End of Session: Up in chair;Needs met;Call light within reach;RN aware of session/findings;All patient questions and concerns addressed;Ice applied;Alarm set;Discussed recommendations with /    Patient Evaluation Complexity Level:  History Moderate - 1 or 2 personal factors and/or co-morbidities   Examination of body systems Low - addressing 1-2 elements   Clinical Presentation Low - Stable   Clinical Decision Making Low Complexity     PT Session Time: 25 minutes  Gait Training: 10 minutes

## 2024-02-05 NOTE — ANESTHESIA PROCEDURE NOTES
Spinal Block    Date/Time: 2/5/2024 10:29 AM    Performed by: Valdez Nicole MD  Authorized by: Valdez Nicole MD      General Information and Staff    Start Time:  2/5/2024 10:29 AM  End Time:  2/5/2024 10:30 AM  Anesthesiologist:  Valdez Nicole MD  Performed by:  Anesthesiologist  Patient Location:  OR  Site identification: surface landmarks    Reason for Block: surgical anesthesia    Preanesthetic Checklist: patient identified, IV checked, risks and benefits discussed, monitors and equipment checked, pre-op evaluation, timeout performed, anesthesia consent and sterile technique used      Procedure Details    Patient Position:  Sitting  Prep: ChloraPrep    Monitoring:  Cardiac monitor, heart rate and continuous pulse ox  Approach:  Midline  Location:  L3-4  Injection Technique:  Single-shot    Needle    Needle Type:  Sprotte  Needle Gauge:  24 G  Needle Length:  3.5 in    Assessment    Sensory Level:   Events: clear CSF, CSF aspirated, well tolerated and blood negative      Additional Comments

## 2024-02-05 NOTE — CM/SW NOTE
Pt with plans for L CALISTA with Dr. Moura. He was pre-operatively set up with [facility]. Updates sent to agency via [employer]. Therapy evals pending at this time.    CM/SW to remain available to assist with discharge planning.    ATUL RubinN, RN-BC    [phone]

## 2024-02-05 NOTE — OPERATIVE REPORT
OPERATIVE REPORT    Facility:  [facility]    Patient Name:  Demarcus Jaramillo    Age/Gender:  58 year old male  :  10/29/1965    MRN:  [id number]    Date of Operation:  2024    Preoperative Diagnosis:  LEFT HIP OSTEOARTHRITIS    Postoperative Diagnosis:  LEFT HIP OSTEOARTHRITIS    Procedure Performed:  LEFT TOTAL HIP ARTHROPLASTY    Surgeon:  PIPER ANDERSON M.D.    Assistant:    1) PRINCESS Osuna  2) Arley Schmidt - Surgical Assist        Anesthesia:  EPIDURAL    Estimated Blood Loss:  150cc    Drain:  NONE    Complications:  NONE    Implants:   1.  Biomet G7 acetabular shell, size 58 mm   2.  Neutral Vivacit-E highly cross-linked polyethylene liner   3.  Yumi Avenir Complete femoral stem, size 7.5, high offset   4.  40 mm, 3.5 Delta ceramic femoral head      Indications for Procedure:  Demarcus Jaramillo is a 58 year old male with osteoarthritis of the left hip who failed conservative management.  After consultation in the office and discussion regarding risks and benefits of surgical treatment, surgery was scheduled and informed consent obtained.    Preop there was posterior glute skin changes/ecchymosis. Patient felt like it was from the heating pad he uses. Reports it has been present and not changing for 2 mo. No erythema. No changes in area of incision. No pain or tenderness in that area and area soft. Discussed with patient and proceeded given benign appearing nature and that the area was away from our surgical site.     Description of Procedure:  The patient was taken to the operating room after the correct surgical site was marked in the preop holding area.  The patient was placed under anesthesia and placed in a supine position on the [place] orthopaedic table.  Preoperative antibiotics were given.  All bony prominences were padded.  The left hip was prepped and draped in usual, sterile surgical fashion.  Bony landmarks were palpated for incision and a direct anterior  approach was performed to the hip between the interval of tensor fascia abdulkadir and sartorius/rectus femoris.  Lateral femoral circumflex vessels were identified, isolated and cauterized.  An L-shaped capsulotomy was performed and the capsule was tagged for retraction.  Retractors were placed inside the hip capsule and further capsular release was performed inside the saddle of the femur as well as down the medial aspect of the femoral neck.  Osteotomy of the femoral neck was performed using a sagittal saw.  A corkscrew was used to remove the femoral head.  Retractors were placed anterior and posterior to the acetabulum.  The hip labrum as well as the soft tissue in the cotyloid fossa were removed in their entirety.  Reaming was then performed using flouroscopy to assess depth, anteversion and abduction angle.  The acetabular shell was then inserted again using flouroscopy to assess abduction angle, anteversion and complete seating of the acetabular component.  A neutral polyethylene was then inserted and impacted without difficulty.    Attention was then turned to the femur where the femoral hook was placed around the femur just distal to the vastus tubercle and proximal to the tendon of the gluteus rekha.  The leg was then externally rotated, extended and adducted with traction completely released.  The mechanical lift arm on the table was used to hold the proximal femur carefully.  Capsule was then further released inside the trochanter until the entire inside of the greater trochanter was easily visualized.  The lateral femoral neck remnant was removed and the femur was broached.  After broaching up to a proper size, trial head and neck components were used to determine stem offset and head length.  Flouroscopy was used to verify the position of the broach as well as the length and offset to determine final implant positions. The hip was taken through a range of motion and noted to be stable in up to 90*  (degrees) external rotation along with up to 50 *(degrees) of extension. The trials were removed and permanent femoral components were opened and inserted.  There were no complications with insertion of the femoral components.  The hip was then reduced under direct visualization.  Flouroscopy again verified length, offset, implant position and stem fill of the femoral canal.  Flouro also verified there were no iatrogenic fractures.  The wound was copiously irrigated (including with dilute betadine solution) and the tag sutures in the capsule were tied together closing the anterior hip capsule.  Local anesthetic and pain medicine was injected into the hip capsule and surrounding soft tissues.  The fascia abdulkadir was closed with absorbable suture.  Skin was then closed in 2 layers with absorbable suture.  Dermabond was applied to the wound and allowed to dry before sterile dressings were applied.  There were no complications and the procedure went as planned.  The surgical assistant was present for the entire procedure and assisted with the approach, exposure, definitive surgery and closure.  The patient left the operating room in stable condition.      PIPER ANDERSON M.D.

## 2024-02-05 NOTE — PROGRESS NOTES
NURSING ADMISSION NOTE      Patient admitted via Cart from PACU  Oriented to room.  Safety precautions initiated.  Bed in low position.  Call light in reach.

## 2024-02-05 NOTE — ANESTHESIA PREPROCEDURE EVALUATION
PRE-OP EVALUATION    Patient Name: Demarcus Jaramillo    Admit Diagnosis: PRIMARY OSTEOARTHRITIS LEFT HIP    Pre-op Diagnosis: PRIMARY OSTEOARTHRITIS LEFT HIP    LEFT ANTERIOR TOTAL HIP ARTHROPLASTY    Anesthesia Procedure: LEFT ANTERIOR TOTAL HIP ARTHROPLASTY (Left: Hip)    Surgeon(s) and Role:     * Keshawn Moura MD - Primary    Pre-op vitals reviewed.  Temp: 97.9 °F (36.6 °C)  Pulse: 91  Resp: 16  BP: 139/99  SpO2: 100 %  Body mass index is 32.87 kg/m².    Current medications reviewed.  Hospital Medications:   [MAR Hold] acetaminophen (Tylenol Extra Strength) tab 1,000 mg  1,000 mg Oral Once    lactated ringers infusion   Intravenous Continuous    [COMPLETED] tranexamic acid in sodium chloride 0.7% (Cyklokapron) 1000 mg/100mL infusion premix 1,000 mg  1,000 mg Intravenous Once    [COMPLETED] ceFAZolin (Ancef) 2 g in 20mL IV syringe premix  2 g Intravenous Once    tranexamic acid in sodium chloride 0.7% (Cyklokapron) 1000 mg/100mL infusion premix 1,000 mg  1,000 mg Intravenous Once    lactated ringers infusion   Intravenous Continuous    lactated ringers IV bolus 500 mL  500 mL Intravenous Once PRN    atropine 0.1 MG/ML injection 0.5 mg  0.5 mg Intravenous PRN    naloxone (Narcan) 0.4 MG/ML injection 0.08 mg  0.08 mg Intravenous PRN    fentaNYL (Sublimaze) 50 mcg/mL injection 25 mcg  25 mcg Intravenous Q5 Min PRN    Or    fentaNYL (Sublimaze) 50 mcg/mL injection 50 mcg  50 mcg Intravenous Q5 Min PRN    HYDROmorphone (Dilaudid) 1 MG/ML injection 0.2 mg  0.2 mg Intravenous Q5 Min PRN    Or    HYDROmorphone (Dilaudid) 1 MG/ML injection 0.4 mg  0.4 mg Intravenous Q5 Min PRN    Or    HYDROmorphone (Dilaudid) 1 MG/ML injection 0.6 mg  0.6 mg Intravenous Q5 Min PRN    acetaminophen (Tylenol Extra Strength) tab 1,000 mg  1,000 mg Oral Once PRN    Or    HYDROcodone-acetaminophen (Norco) 5-325 MG per tab 1 tablet  1 tablet Oral Once PRN    Or    HYDROcodone-acetaminophen (Norco) 5-325 MG per tab 2 tablet  2 tablet Oral  Once PRN    acetaminophen (Tylenol) tab 650 mg  650 mg Oral Once    labetalol (Trandate) 5 mg/mL injection 5 mg  5 mg Intravenous Q5 Min PRN    ondansetron (Zofran) 4 MG/2ML injection 4 mg  4 mg Intravenous Q6H PRN    prochlorperazine (Compazine) 10 MG/2ML injection 5 mg  5 mg Intravenous Q8H PRN    midazolam (Versed) 2 MG/2ML injection 1 mg  1 mg Intravenous Q5 Min PRN    diphenhydrAMINE (Benadryl) 50 mg/mL  injection 12.5 mg  12.5 mg Intravenous PRN    meperidine (Demerol) 25 MG/ML injection 12.5 mg  12.5 mg Intravenous PRN    clonidine/epinephrine/ropivacaine/ketorolac in 0.9% NaCl 60 mL pain cocktail syringe for hip arthroplasty    PRN    povidone-iodine (Betadine) 10 % 17.5 mL in sodium chloride 0.9 % 500 mL irrigation    PRN    [COMPLETED] acetaminophen (Tylenol) 325 MG tab           Outpatient Medications:     Medications Prior to Admission   Medication Sig Dispense Refill Last Dose    oxyCODONE 5 MG Oral Tab Take 1 tablet (5 mg total) by mouth every 4 (four) hours as needed for Pain.   post op    traMADol 50 MG Oral Tab Take 1 tablet (50 mg total) by mouth every 4 to 6 hours as needed for Pain.   post op    acetaminophen 325 MG Oral Tab Take 2 tablets (650 mg total) by mouth every 4 (four) hours.   2/5/2024 at 0600    aspirin 81 MG Oral Tab EC Take 1 tablet (81 mg total) by mouth 2 (two) times daily. To take for 6 weeks total for blood clot prevention.   post op    celecoxib 200 MG Oral Cap Take 1 capsule (200 mg total) by mouth daily.   post op    pregabalin 75 MG Oral Cap Take 1 capsule (75 mg total) by mouth daily.   post op    cephalexin 500 MG Oral Cap Take 1 capsule (500 mg total) by mouth 2 (two) times daily.   post op    sennosides-docusate 8.6-50 MG Oral Tab Take 1 tablet by mouth daily.   post op    losartan-hydroCHLOROthiazide 50-12.5 MG Oral Tab Take 1 tablet by mouth 2 (two) times a day. 180 tablet 1 2/4/2024 at 1900       Allergies: Patient has no known allergies.      Anesthesia  Evaluation    Patient summary reviewed.    Anesthetic Complications  (-) history of anesthetic complications         GI/Hepatic/Renal    Negative GI/hepatic/renal ROS.                             Cardiovascular        Exercise tolerance: good     MET: >4    (+) obesity  (+) hypertension                                     Endo/Other                           (+) arthritis       Pulmonary    Negative pulmonary ROS.                       Neuro/Psych    Negative neuro/psych ROS.                                  Past Surgical History:   Procedure Laterality Date    COLONOSCOPY       Social History     Socioeconomic History    Marital status: Unknown   Tobacco Use    Smoking status: Former     Packs/day: 1.00     Years: 30.00     Additional pack years: 0.00     Total pack years: 30.00     Types: Cigarettes     Quit date: 2023     Years since quittin.1    Smokeless tobacco: Never   Vaping Use    Vaping Use: Never used   Substance and Sexual Activity    Alcohol use: Yes     Alcohol/week: 6.0 standard drinks of alcohol     Types: 6 Cans of beer per week    Drug use: Never     History   Drug Use Unknown     Available pre-op labs reviewed.     Lab Results   Component Value Date     (L) 2024    K 3.8 2024    CL 97 (L) 2024    CO2 31.0 2024    BUN 9 2024    CREATSERUM 0.86 2024     (H) 2024    CA 9.3 2024            Airway      Mallampati: III  Mouth opening: >3 FB  TM distance: > 6 cm   Cardiovascular    Cardiovascular exam normal.         Dental             Pulmonary    Pulmonary exam normal.                 Other findings              ASA: 3   Plan: spinal  NPO status verified and patient meets guidelines.          Plan/risks discussed with: patient                Present on Admission:  **None**

## 2024-02-05 NOTE — ANESTHESIA POSTPROCEDURE EVALUATION
[facility]    Demarcus Jaramillo Patient Status:  Outpatient in a Bed   Age/Gender 58 year old male MRN [id number]   Location [facility] SURGERY Attending Keshawn Moura MD   Hosp Day # 0 PCP Radha Foreman MD       Anesthesia Post-op Note    LEFT ANTERIOR TOTAL HIP ARTHROPLASTY    Procedure Summary       Date: 02/05/24 Room / Location:  MAIN OR 14 / [facility] MAIN OR    Anesthesia Start: 1019 Anesthesia Stop: 1221    Procedure: LEFT ANTERIOR TOTAL HIP ARTHROPLASTY (Left: Hip) Diagnosis: (PRIMARY OSTEOARTHRITIS LEFT HIP)    Surgeons: Keshawn Moura MD Anesthesiologist: Valdez Nicole MD    Anesthesia Type: spinal ASA Status: 3            Anesthesia Type: spinal    Vitals Value Taken Time   /92 02/05/24 1221   Temp 98.2 °F (36.8 °C) 02/05/24 1221   Pulse 93 02/05/24 1221   Resp 16 02/05/24 1221   SpO2 97 % 02/05/24 1221       Patient Location: PACU    Anesthesia Type: spinal    Airway Patency: patent    Postop Pain Control: adequate    Mental Status: preanesthetic baseline    Nausea/Vomiting: none    Cardiopulmonary/Hydration status: stable euvolemic    Complications: no apparent anesthesia related complications    Postop vital signs: stable    Dental Exam: Unchanged from Preop    Patient to be discharged from PACU when criteria met.

## 2024-02-05 NOTE — DISCHARGE INSTRUCTIONS
Sometimes managing your health at home requires assistance.  The [facility] team has recognized your preference to use [facility].  They can be reached by phone at [phone].  The fax number for your reference is [phone].. A representative from the home health agency will contact you or your family to schedule your first visit.          Hip Replacement Discharge Instructions    Activity    Bathing  No tub baths, pools, or saunas until cleared by surgeon (about 4-6 weeks because it takes that long for the incision on the skin to heal and be a barrier to prevent infection.)  When allowed to shower:      AQUACEL dressing is waterproof and does not require being covered before showering.  Pat dressing and surrounding skin dry after shower                      AQUACEL        MEDIPORE/COVERLET dressing is NOT waterproof and REQUIRES being covered with a waterproof barrier to keep the dressing and incision dry.  SARAN WRAP, GLAD WRAP, PRESS N SEAL WORK REALLY WELL BUT ANY PLASTIC WRAP WILL DO.  Do not wash incision.   Remove entire wrapping and old dressing (if Medipore/coverlet) after showering. Pat dry with a CLEAN TOWEL if necessary and cover incision with new Medipore/coverlet. For other types of dressings, follow surgeon’s orders.          MEDIPORE/COVERLET            Driving  Do not drive until cleared by surgeon. This is usually four to six weeks after surgery. Discuss this at follow-up office visit.   Not allowed while taking narcotic pain medication or muscle relaxants.    Sex  Usually allowed after four to six weeks - check with surgeon at your office visit.  Return to work  Usually allowed after four to six weeks. Discuss specific work activities with your surgeon.    Restrictions  For hip replacement surgery, follow instructions provided by physical therapy    No smoking  Avoid smoking. It is known to cause breathing problems and can decrease the rate of healing.    Incision  care/Dressing changes  Wash hands before and after dressing changes.    FOR MEDIPORE/COVERLET DRESSINGS:  Change dressing daily using Medipore/coverlet once Aquacel (waterproof) dressing is removed (which is about 7 days after surgery). Patient should be standing or lying flat so dressing goes on smoothly.  (This dressing needed for hip patients because of location of incision-don’t want contamination from bathroom use)   Continue this until your first visit with your surgeon’s office.  There could be a small amount of redness around the staples or incision; this is normal.  Watch for increased redness, warmth, any odor, increased drainage or opening of the incision. A little clear yellow or blood tinged drainage is normal up to 2 weeks after surgery but it should be less every day until it stops.  Call physician if you notice any concerning changes.  Sutures/staples will be removed at first office visit (10 days- 3 weeks).                    MEDIPORE /COVERLET          Medication  Anticoagulants = blood thinners (Xarelto, Eliquis, Lovenox, Coumadin or Aspirin)  Pill or shot form depending on what your physician orders.   IF placed on Coumadin, you may also need lab work done for monitoring.  You will bleed easier and bruise easier while on these medications.     Usually you will be on a blood thinner for about 4-5 weeks.  Contact your physician if you have signs of bruising, nose bleeds or blood in your urine. Use electric razors and soft toothbrushes only.  Do not take aspirin while taking blood thinners unless ordered by your physician.  Review anticoagulant education information sheet provided.    Discomfort  Surgical discomfort is normal for one to two months.  Have realistic goals and keep a positive outlook.  Keep pain manageable; pain should not disrupt your sleep or activities like getting out of bed or walking.  You may need pain medication regularly (every 4-6 hours) the first 2 weeks and then begin to  decrease how often you are taking it.  Take pain medication as prescribed with food, especially before therapy, allowing 30-60 minutes to take effect.  Do not drink alcohol while on pain medication.  As you have less discomfort, decrease the amount of pain medication you take. Use plain Tylenol (acetaminophen) for less severe pain.  Some pain medications have Tylenol (acetaminophen) in them such as Norco and Percocet. Do NOT take Tylenol (acetaminophen) within 4 hours of a dose of these medications.  Apply ice  or cold therapy to surgical site for 20 minutes at least four times a day, especially after therapy. Be sure there is a thin cloth barrier between skin and ice or cold therapy.  Change position at least every 45 minutes while awake to avoid stiffness or increased discomfort.  Deep breathing and relaxation techniques and distractions can help!  If you focus on something else, you do not experience the pain the same. Take advantage of everything available to your to help control you discomfort.  Contact physician if discomfort does not respond to pain medication.    Body changes  Constipation is common with the use of narcotics.  Eat fiber rich foods and drink plenty of fluids.  Use stool softeners such as Colace or Senakot while on narcotics, and laxatives such as Miralax or Milk of Magnesia if needed.   An enema or suppository may be needed if above measures do not work.    Prevention of infection and promotion of healing  Good hand washing is important. Everyone should wash their hands or use hand  as soon as they walk in your house-whether they live there or are visiting.  Keep bed linen/clothing freshly laundered.  Do not allow others or pets to touch your incision.  Avoid people that have colds or the flu.  Your surgeon may recommend that you take antibiotics before you undergo any dental or other invasive surgical procedures after your joint replacement. Speak with your physician about this at  your post-op office visit.  Eat a balanced diet high in fiber and drink plenty of fluids.   Continue using incentive spirometry because narcotics make you sleepy so you may not take good deep breaths. We do not want you to get pneumonia.     Post op Office visits  Schedule 10 days to 3 weeks after surgery WITH SURGEON’s office.  Additional visits may need to be scheduled. Your physician will discuss this at first post-op office visit.  Schedule outpatient physical therapy per your surgeon’s orders.  Schedule one week follow up after surgery WITH PRIMARY CARE PHYSICIAN; review your medications over last 6 months.  Your body gets stressed by surgery and that stress can affect all your other health issues (such as high blood pressure, diabetes, CHF, afib, and asthma just to name a few).  We don’t want those other health issues to cause you to get readmitted to the hospital; much better for you to catch developing problems and prevent them from becoming larger ones.  MINNIE HOSE - IF ordered by your surgeon, wear these during the day and off at night.  Surgeon will tell you when you don’t need them anymore.    Notify your surgeon if you notice any of the following signs  Separation of incision line.  Increased redness, swelling, or warmth of skin around incision.  Increased or foul smelling drainage from incision  Red streaks on skin near incision.  Temperature >100.4F.  Increased pain at incision not relieved by pain medication.    Signs of Possible Dislocation  Increased severe leg or groin pain  Turning in or out of surgical leg that is new  Increased numbness, tingling to leg  Inability to walk or put weight on your surgical leg      Signs of blood clot  Pain, excessive tenderness, redness, or swelling in leg or calf (other than incision site).    Go directly to the ER or CALL 911 if  you:  become short of breath  have chest pain  cough up blood  have unexplained anxiety with breathing   Traveling and Handicapped  parking  Check with you surgeon when you are allowed to travel so you don’t set yourself up for greater chance of complications.  If traveling by car, get out to stretch every 2 hours.  This helps prevent stiffness.  You may need to do this any time you travel for the first year after surgery.  If traveling by plane, BEFORE you get into a security line, let them know that you had your hip replaced, as you will most likely set off the metal detector. The doctors no longer provide an identification card for this as they are easily copied. ALSO request a wheelchair the first year to board and get off a plane…this aids in priority seating and you should sit on the aisle or at the bulkhead where you can easily stretch your legs and get up to walk up and down the aisles…this helps prevent blood clots and stiffness.  TEMPORARY HANDICAP PARKING APPLICATION  (good for 3-6 months)  - At Surgeon or PCP visit, request they fill out the form, then go to [facility] (only time you do not wait in a long line there). Some [facility] offices provide the same service. ([facility] and [place] have this service; if you live in another [facility], you may check with them as well). You need space to open car doors to position yourself properly with walker to get in and out of your car safely; some parking spaces are  practically on top of each other and do not give you enough room.       SPECIAL  INSTRUCTIONS:Spanda- hip replacement(single layer compression sleeve):     All patients should wear the compression sleeve until first follow up visit to surgeon’s office (about 2-3 weeks) and then check with surgeon if need to continue use.  Take off to shower. Best to keep on as much as possible, even at night, except when washing out.  Wash with mild soap and water; DRIP dry overnight- put back on before getting up for the day.  Use one long tube on the calf.   It should end up just below the back of the knee and the other end on the ball  of the foot to expose the toes.   Makes sure it is NOT bunched up anywhere.  IF the Spanda- feels TOO tight, then REMOVE it and call your surgeon to let them know.

## 2024-02-06 VITALS
HEART RATE: 73 BPM | BODY MASS INDEX: 32.97 KG/M2 | OXYGEN SATURATION: 94 % | TEMPERATURE: 98 F | SYSTOLIC BLOOD PRESSURE: 125 MMHG | RESPIRATION RATE: 16 BRPM | WEIGHT: 222.63 LBS | DIASTOLIC BLOOD PRESSURE: 76 MMHG | HEIGHT: 69 IN

## 2024-02-06 PROCEDURE — 97116 GAIT TRAINING THERAPY: CPT

## 2024-02-06 PROCEDURE — 97165 OT EVAL LOW COMPLEX 30 MIN: CPT

## 2024-02-06 PROCEDURE — 97535 SELF CARE MNGMENT TRAINING: CPT

## 2024-02-06 NOTE — PLAN OF CARE
A&Ox 4. VSS. On room air. . IS encouraged. SCDs on BLE. Ankle pumps encouraged. Tolerating diet. Voiding without difficulty. Pain controlled with scheduled meds. Dressing to left C/D/I. Pt WBAT, up with SBA using walker. Discharge with hh when cleared. Patient updated on plan of care. Safety precautions maintained. Call light within reach.

## 2024-02-06 NOTE — OCCUPATIONAL THERAPY NOTE
OCCUPATIONAL THERAPY EVALUATION - INPATIENT    Room Number: 368/368-A  Evaluation Date: 2/6/2024     Type of Evaluation: Initial  Presenting Problem: s/p L CALISTA 2/5/23    Physician Order: IP Consult to Occupational Therapy  Reason for Therapy:  ADL/IADL Dysfunction and Discharge Planning      OCCUPATIONAL THERAPY ASSESSMENT   Patient is a 58 year old male admitted 2/5/2024 for L CALISTA.  Patient is currently functioning near baseline, currently supervision for standing ADLs and independent for all other ADLs.  Prior to admission, patient's baseline is independent.  Patient met all OT goals at supervision to Mod I level, reports will have initial supervision from sister.  Patient reports no further questions/concerns at this time.       WEIGHT BEARING RESTRICTION  Weight Bearing Restriction: L lower extremity           L Lower Extremity: Weight Bearing as Tolerated    Recommendations for nursing staff:   Transfers: 1-person  Toileting location: Toilet    EVALUATION SESSION:  Patient at start of session: chair  FUNCTIONAL TRANSFER ASSESSMENT  Sit to Stand: Chair  Chair: Supervision  Toilet Transfer: Supervision (standard toilet, light use of grab bar, reports also has grab bar at home)    BED MOBILITY  Supine to Sit : Modified Independent  Sit to Supine (OT): Modified Independent    BALANCE ASSESSMENT     FUNCTIONAL ADL ASSESSMENT  UB Dressing Seated: Modified Independent  LB Dressing Seated: Supervision (sufficient reach without AE)  LB Dressing Standing: Supervision  Toileting Seated: Supervision  Toileting Standing: Supervision      ACTIVITY TOLERANCE:                          O2 SATURATIONS       COGNITION  Overall Cognitive Status:  WFL - within functional limits  COGNITION ASSESSMENTS       Upper Extremity:   ROM: within functional limits   Strength: is within functional limits     EDUCATION PROVIDED  Patient: Role of Occupational Therapy; Plan of Care; Functional Transfer Techniques; Weight Bear Status;  Compensatory ADL Techniques  Patient's Response to Education: Verbalized Understanding    Equipment used: RW  Demonstrates functional use    Therapist comments: Patient motivated and participatory. Performed all ADLs and functional transfers SBA to Mod I, reports will have initial supervision from sister at discharge. Patient reports no further questions or concerns regarding discharge home to ADLs.     Patient End of Session: Up in chair;Needs met;Call light within reach;RN aware of session/findings;All patient questions and concerns addressed    OCCUPATIONAL PROFILE    HOME SITUATION  Type of Home: Other (Comment) ([place])  Home Layout: One level  Lives With: Alone (Sister plans to stay with pt for a few days after dc (she also lives right next door))    Toilet and Equipment: Standard height toilet;Grab bar;Toilet riser  Shower/Tub and Equipment: Grab bar;Shower chair (patient has walk-in tub, plans to use WIS at sister's house (lives next door))  Other Equipment: None    Occupation/Status:      Drives: Yes  Patient Regularly Uses: Reading glasses    Prior Level of Function: Patient reports independent in all I/ADL and functional mobility without device prior to admission.    SUBJECTIVE  \"My problem wasn't pain before, it was just hard to walk\"    PAIN ASSESSMENT  Ratin  Location: L hip  Management Techniques: Activity promotion;Body mechanics;Repositioning;Ice    OBJECTIVE  Precautions: Bed/chair alarm  Fall Risk: Standard fall risk    WEIGHT BEARING RESTRICTION  Weight Bearing Restriction: L lower extremity           L Lower Extremity: Weight Bearing as Tolerated    AM-PAC ‘6-Clicks’ Inpatient Daily Activity Short Form  -   Putting on and taking off regular lower body clothing?: A Little (supervision)  -   Bathing (including washing, rinsing, drying)?: A Little (supervision)  -   Toileting, which includes using toilet, bedpan or urinal? : A Little (supervision)  -   Putting on and taking off  regular upper body clothing?: None  -   Taking care of personal grooming such as brushing teeth?: None  -   Eating meals?: None    AM-PAC Score:  Score: 21  Approx Degree of Impairment: 32.79%  Standardized Score (AM-PAC Scale): 44.27      ADDITIONAL TESTS     NEUROLOGICAL FINDINGS        PLAN   Patient has been evaluated and presents with no skilled Occupational Therapy needs at this time.  Patient discharged from Occupational Therapy services.  Please re-order if a new functional limitation presents during this admission.      Patient Evaluation Complexity Level:   Occupational Profile/Medical History LOW - Brief history including review of medical or therapy records    Specific performance deficits impacting engagement in ADL/IADL LOW  1 - 3 performance deficits    Client Assessment/Performance Deficits LOW - No comorbidities nor modifications of tasks    Clinical Decision Making LOW - Analysis of occupational profile, problem-focused assessments, limited treatment options    Overall Complexity LOW     OT Session Time: 23 minutes  Self-Care Home Management: 8 minutes

## 2024-02-06 NOTE — PROGRESS NOTES
[facility]    Progress Note     Demarcus Jaramillo Patient Status:  Outpatient in a Bed    10/29/1965 MRN [id number]   Location [facility] 3SW-A Attending Keshawn Moura MD   Hosp Day # 0 PCP Radha Foreman MD     Follow up for: There were no encounter diagnoses.      Interval History/Subjective:     SUE overnight  Afebrile, HDS    Feels well, pain minimal. Already did stairs with PT this morning, anxious to go home as soon as possible    Vital signs:  Temp:  [96.5 °F (35.8 °C)-98.2 °F (36.8 °C)] 97.7 °F (36.5 °C)  Pulse:  [] 73  Resp:  [11-28] 16  BP: ()/(59-99) 125/76  SpO2:  [94 %-100 %] 94 %    Physical Exam:    General: NAD, Comfortable, Nontoxic   Respiratory: CTAB; reg resp rate & effort, no wheezes/crackles  Cardiovascular: S1, S2. Regular rate and rhythm. No murmurs appreciated  Abdomen: Soft, NTND, no guarding/rebound   Neurologic: No focal neurological deficits.   Extremities: No edema. WWP, distal SILT  Skin: Dry, no rashes, ulcers or lesions; Left hip dressing clean/dry/intact    Diagnostic Data:      Labs:  No results for input(s): \"WBC\", \"HGB\", \"MCV\", \"PLT\", \"BAND\", \"INR\" in the last 168 hours.    Invalid input(s): \"LYM#\", \"MONO#\", \"BASOS#\", \"EOSIN#\"    Recent Labs   Lab 24  0903   *   BUN 9   CREATSERUM 0.86   CA 9.3   *   K 3.8   CL 97*   CO2 31.0       No results for input(s): \"PTP\", \"INR\" in the last 168 hours.    No results for input(s): \"TROP\", \"CK\" in the last 168 hours.         Imaging: Imaging data reviewed in Epic.    Medications:    sennosides  17.2 mg Oral Nightly    docusate sodium  100 mg Oral BID    famotidine  20 mg Oral BID    Or    famotidine  20 mg Intravenous BID    ferrous sulfate  325 mg Oral Daily with breakfast    aspirin  81 mg Oral BID    dexAMETHasone PF  8 mg Intravenous Once    acetaminophen  650 mg Oral Q4H While awake    ketorolac  30 mg Intravenous Q6H    pregabalin  75 mg Oral Daily       ASSESSMENT / PLAN:     Demarcus  Tad Jaramillo Is a a 58 year old male who presents following a left total hip arthroplasty     Problem List / Diagnoses     Left hip OA s/p TKA  Hypertension     Recommendations     Left hip OA s/p TKA  -Pain controlled with oral meds   - PT/OT -> home   - DVT prophylaxis per orthopedic surgery  - Bowel regimen     Hypertension  -BP currently well controlled, cont holding BP meds today; ok to resume tomorrow     DVT Mechanical Prophylaxis: MINNIE hose, SCDs, Early ambuation  DVT Pharmacologic Prophylaxis   Medication   None         DVT Pharmacologic prophylaxis: Aspirin 81 mg      Dispo: stable on floor, no medical contraindications to discharge from my persepctive     Plan of care discussed with patient and/or family at bedside.    N Tad Dias MD  [facility]   [phone]      This note was created using voice recognition technology. It may include inadvertent transcriptional errors. Any such errors should be contextually interpreted and should not be taken to alter the content or the meaning.     Note to Patient: The 21st Century Cures Act makes medical notes like these available to patients in the interest of transparency. However, be advised this is a medical document. It is intended as peer to peer communication. It is written in medical language and may contain abbreviations or verbiage that are unfamiliar. It may appear blunt or direct. Medical documents are intended to carry relevant information, facts as evident, and the clinical opinion of the practitioner and not intended to be the primary source of your information.  Please refer directly to myself or clinical staff for information regarding plan of care.

## 2024-02-06 NOTE — PHYSICAL THERAPY NOTE
PHYSICAL THERAPY TREATMENT NOTE - INPATIENT    Room Number: 368/368-A     Session: 1/2     Number of Visits to Meet Established Goals: 2    Presenting Problem: s/p L CALISTA  Co-Morbidities : essential HTN, ETOH    History related to current admission: Patient is a 58 year old male admitted on 2/5/2024 for L CALISTA with Dr. Moura.     Procedure Performed 2/5/24:  LEFT TOTAL HIP ARTHROPLASTY  ASSESSMENT   Patient demonstrates excellent progress this session as he as met all of his IP PT goals.   Patient is currently functioning at baseline with bed mobility, transfers, gait, and stair negotiation.    Patient currently does not meet criteria for skilled inpatient physical therapy services, however patient will continue to benefit from QID ambulation with nursing staff.  Pt is hereby discharged from IP PT services.  RN aware to re-order if there is a decline in mobility status.      PLAN  PT Treatment Plan: Bed mobility;Body mechanics;Coordination;Endurance;Energy conservation;Patient education;Family education;Gait training;Neuromuscular re-educate;Range of motion;Strengthening;Stoop training;Stair training;Transfer training;Balance training  Rehab Potential : Good  Frequency (Obs): Daily    CURRENT GOALS   Goal #1  Patient is able to demonstrate supine - sit EOB @ level: supervision      Goal #2  Patient is able to demonstrate transfers Sit to/from Stand at assistance level: supervison         Goal #3     Patient is able to ambulate 150 feet with assistive device at assistance level: supervision   Goal #4     Patient will negotiate 4 stairs/one curb w/ assistive device and supervision   Goal #5     Patient verbalizes and/or demonstrates all precautions and safety concerns independently    Goal #6        Goal Comments: Goals established on 2/5/2024  **all goals met 2/6/24      SUBJECTIVE  \"The pain is barely a 1.\"    OBJECTIVE  Precautions: Bed/chair alarm    WEIGHT BEARING RESTRICTION  Weight Bearing Restriction: L  lower extremity           L Lower Extremity: Weight Bearing as Tolerated    PAIN ASSESSMENT   Ratin  Location: left hip  Management Techniques: Activity promotion;Body mechanics;Repositioning    BALANCE                                                                                                                       Static Sitting: Good  Dynamic Sitting: Fair +           Static Standing: Fair  Dynamic Standing: Fair -    ACTIVITY TOLERANCE                         O2 WALK         AM-PAC '6-Clicks' INPATIENT SHORT FORM - BASIC MOBILITY  How much difficulty does the patient currently have...  Patient Difficulty: Turning over in bed (including adjusting bedclothes, sheets and blankets)?: None   Patient Difficulty: Sitting down on and standing up from a chair with arms (e.g., wheelchair, bedside commode, etc.): A Little   Patient Difficulty: Moving from lying on back to sitting on the side of the bed?: None   How much help from another person does the patient currently need...   Help from Another: Moving to and from a bed to a chair (including a wheelchair)?: A Little   Help from Another: Need to walk in hospital room?: A Little   Help from Another: Climbing 3-5 steps with a railing?: A Little       AM-PAC Score:  Raw Score: 20   Approx Degree of Impairment: 35.83%   Standardized Score (AM-PAC Scale): 47.67   CMS Modifier (G-Code): CJ    FUNCTIONAL ABILITY STATUS  Gait Assessment   Functional Mobility/Gait Assessment  Gait Assistance: Supervision  Distance (ft): 50, 150  Assistive Device: Rolling walker  Pattern: Within Functional Limits  Stairs: Stairs  How Many Stairs: 4  Device: 2 Rails  Assist: Supervision  Pattern: Ascend and Descend  Ascend and Descend : Step to    Skilled Therapy Provided    Bed Mobility:  Rolling: NT   Supine<>Sit: NT   Sit<>Supine: NT     Transfer Mobility:  Sit<>Stand: supervision to RW   Stand<>Sit: supervision   Gait: supervision w/ RW x 50 feet, 150 feet. Step through gait pattern.      Therapist's Comments:  Patient presents sitting up in bedside chair. Discussed role and goal of physical therapy. Pt in agreement to session. Pt reports at this time 1/10 pain located in left hip. Educated on weight bearing status: WBAT. Pt verbalizes understanding.  Sit to stand to RW completed at supervision. Pt ambulated 50 feet with use of RW at supervision. Observed step through pattern while ambulating. Ascended/descended 4 steps with use of 2 rails at supervision level. Ambulated 150 feet w/ RW back to room.  At end of session pt reporting no incr in pain. Pt upright in chair at end of session.    Patient End of Session: Up in chair;Needs met;Call light within reach;RN aware of session/findings;All patient questions and concerns addressed;Discussed recommendations with /    PT Session Time: 15 minutes  Gait Trainin minutes

## 2024-02-06 NOTE — PLAN OF CARE
A&Ox4, VSS on room air. POD#1, dressing C/D/I. Pain controlled, gel ice pack on. Compression sleeve to LLE. Ambulating halls with walker. Plan to DC home today with C.

## 2024-02-06 NOTE — CONSULTS
[facility] Hospitalist Consult Note     Demarcus Jaramillo Patient Status:  Outpatient in a Bed    10/29/1965 MRN [id number]   Location [facility] 3SW-A Attending Keshawn Moura MD   Hosp Day # 0 PCP Radha Foreman MD     Consult: Separative medical comanagement    Consulting Provider: Keshawn Moura MD    History of Present Illness: Demarcus Jaramilol is a 58 year old male with hypertension, left hip osteoarthritis who presents following scheduled left total hip arthroplasty.  The procedure was well-tolerated and had approximately 150 mL EBL.  Postoperatively he remained afebrile and hemodynamically stable.  Following up to the floor, he reports his pain is controlled and he denies any new or worsening symptoms.  He specifically denies chest pain, shortness of breath, nausea or lower extremity paresthesias.    Past Medical History:  Past Medical History:   Diagnosis Date    High blood pressure     Osteoarthritis     Visual impairment     GLASSES        Past Surgical History:   Past Surgical History:   Procedure Laterality Date    COLONOSCOPY         Social History:  reports that he quit smoking about 7 weeks ago. His smoking use included cigarettes. He has a 30 pack-year smoking history. He has never used smokeless tobacco. He reports current alcohol use of about 6.0 standard drinks of alcohol per week. He reports that he does not use drugs.    Family History: History reviewed. No pertinent family history.    Allergies: No Known Allergies    Medications:    No current facility-administered medications on file prior to encounter.     Current Outpatient Medications on File Prior to Encounter   Medication Sig Dispense Refill    oxyCODONE 5 MG Oral Tab Take 1 tablet (5 mg total) by mouth every 4 (four) hours as needed for Pain.      traMADol 50 MG Oral Tab Take 1 tablet (50 mg total) by mouth every 4 to 6 hours as needed for Pain.      acetaminophen 325 MG Oral Tab Take 2 tablets (650  mg total) by mouth every 4 (four) hours.      aspirin 81 MG Oral Tab EC Take 1 tablet (81 mg total) by mouth 2 (two) times daily. To take for 6 weeks total for blood clot prevention.      celecoxib 200 MG Oral Cap Take 1 capsule (200 mg total) by mouth daily.      pregabalin 75 MG Oral Cap Take 1 capsule (75 mg total) by mouth daily.      cephalexin 500 MG Oral Cap Take 1 capsule (500 mg total) by mouth 2 (two) times daily.      sennosides-docusate 8.6-50 MG Oral Tab Take 1 tablet by mouth daily.      losartan-hydroCHLOROthiazide 50-12.5 MG Oral Tab Take 1 tablet by mouth 2 (two) times a day. 180 tablet 1       Review of Systems:   A comprehensive 14 point review of systems was completed.    Pertinent positives and negatives noted in the HPI.    Physical Exam:    /75 (BP Location: Left arm)   Pulse 88   Temp 97.9 °F (36.6 °C) (Oral)   Resp 18   Ht 5' 9\" (1.753 m)   Wt 222 lb 9.6 oz (101 kg)   SpO2 100%   BMI 32.87 kg/m²       General: No acute distress. Comfortable, nontoxic   HEENT: Normocephalic atraumatic. Moist mucous membranes; Sclera anicteric.  Neck: Supple, no JVD  Respiratory: Clear to auscultation bilaterally. Reg resp rate & effort, no wheezes/crackles   Cardiovascular: S1, S2. Regular rate and rhythm. No murmurs appreciable   Chest and Back: No tenderness or deformity.  Abdomen: Soft, nontender, nondistended.  Positive bowel sounds. No rebound, guarding or organomegaly.  Neurologic: No focal neurological deficits. CNII-XII grossly intact.  Musculoskeletal: Moves all extremities.  Extremities: No edema or cyanosis.  Skin/lines: No rashes or lesions.  Dressing clean/dry/intact  Psychiatric: Appropriate mood and affect.      Diagnostic Data:      Labs:  No results for input(s): \"WBC\", \"HGB\", \"MCV\", \"PLT\", \"BAND\", \"INR\" in the last 168 hours.    Invalid input(s): \"LYM#\", \"MONO#\", \"BASOS#\", \"EOSIN#\"    Recent Labs   Lab 02/05/24  0903   *   BUN 9   CREATSERUM 0.86   CA 9.3   *   K  3.8   CL 97*   CO2 31.0       Estimated Creatinine Clearance: 91.3 mL/min (based on SCr of 0.86 mg/dL).    No results for input(s): \"PTP\", \"INR\" in the last 168 hours.    No results for input(s): \"TROP\", \"CK\" in the last 168 hours.    Imaging: Imaging data reviewed in Epic.      ASSESSMENT / PLAN:     Demarcus Jaramillo Is a a 58 year old male who presents following a left total hip arthroplasty    Problem List / Diagnoses    Left hip OA s/p TKA  Hypertension    Recommendations    Left hip OA s/p TKA  -Pain controlled with current regimen, continue  - PT/OT pending  - DVT prophylaxis per orthopedic surgery  - Bowel regimen    Hypertension  -Hold home antihypertensives until P and D #1, resume as BP allows2    DVT Mechanical Prophylaxis: MINNIE hose, SCDs, Early ambuation  DVT Pharmacologic Prophylaxis   Medication   None         DVT Pharmacologic prophylaxis: Aspirin 81 mg      Dispo: Stable on floor, will follow    Plan of care discussed with patient and/or family at bedside.     N Tad Dias MD  [facility]   [phone]

## 2024-02-06 NOTE — PROGRESS NOTES
AVS reviewed, IV dc'd, discharge video watched, will dc home w/ Fabiana C after seen by OT , meds filled at home .

## 2024-02-06 NOTE — PROGRESS NOTES
Progress Note    Patient: Demarcus Jaramillo  Medical record #: [id number]    Demarcus Jaramillo is a 58 year old  male who is POD #1 left CALISTA.  The patient's main complaint today is surgical pain at the operative site. Denies paresthesias/CP/SOA. Patient has been working with physical therapy.      Hospital Problem List:  Active Problems:    * No active hospital problems. *      Current Medications:   lactated ringers infusion   Intravenous Continuous    [COMPLETED] tranexamic acid in sodium chloride 0.7% (Cyklokapron) 1000 mg/100mL infusion premix 1,000 mg  1,000 mg Intravenous Once    [COMPLETED] ceFAZolin (Ancef) 2 g in 20mL IV syringe premix  2 g Intravenous Once    sodium chloride 0.9 % IV bolus 500 mL  500 mL Intravenous Once PRN    sennosides (Senokot) tab 17.2 mg  17.2 mg Oral Nightly    docusate sodium (Colace) cap 100 mg  100 mg Oral BID    polyethylene glycol (PEG 3350) (Miralax) 17 g oral packet 17 g  17 g Oral Daily PRN    magnesium hydroxide (Milk of Magnesia) 400 MG/5ML oral suspension 30 mL  30 mL Oral Daily PRN    bisacodyl (Dulcolax) 10 MG rectal suppository 10 mg  10 mg Rectal Daily PRN    fleet enema (Fleet) 7-19 GM/118ML rectal enema 133 mL  1 enema Rectal Once PRN    ondansetron (Zofran) 4 MG/2ML injection 4 mg  4 mg Intravenous Q6H PRN    prochlorperazine (Compazine) 10 MG/2ML injection 5 mg  5 mg Intravenous Q8H PRN    famotidine (Pepcid) tab 20 mg  20 mg Oral BID    Or    famotidine (Pepcid) 20 mg/2mL injection 20 mg  20 mg Intravenous BID    [] diphenhydrAMINE (Benadryl) 50 mg/mL  injection 25 mg  25 mg Intravenous Once PRN    diphenhydrAMINE (Benadryl) cap/tab 25 mg  25 mg Oral Q4H PRN    Or    diphenhydrAMINE (Benadryl) 50 mg/mL  injection 12.5 mg  12.5 mg Intravenous Q4H PRN    ferrous sulfate DR tab 325 mg  325 mg Oral Daily with breakfast    aspirin DR tab 81 mg  81 mg Oral BID    [COMPLETED] ceFAZolin (Ancef) 2 g in 20mL IV syringe premix  2 g Intravenous Q8H     tiZANidine (Zanaflex) tab 2 mg  2 mg Oral Q6H PRN    dexAMETHasone PF (Decadron) 10 MG/ML injection 8 mg  8 mg Intravenous Once    oxyCODONE immediate release tab 5 mg  5 mg Oral Q4H PRN    Or    oxyCODONE immediate release tab 10 mg  10 mg Oral Q4H PRN    HYDROmorphone (Dilaudid) 1 MG/ML injection 0.4 mg  0.4 mg Intravenous Q2H PRN    Or    HYDROmorphone (Dilaudid) 1 MG/ML injection 0.8 mg  0.8 mg Intravenous Q2H PRN    acetaminophen (Tylenol) tab 650 mg  650 mg Oral Q4H While awake    ketorolac (Toradol) 30 MG/ML injection 30 mg  30 mg Intravenous Q6H    [COMPLETED] acetaminophen (Tylenol) tab 650 mg  650 mg Oral Once    pregabalin (Lyrica) cap 75 mg  75 mg Oral Daily         Input/Output:    Intake/Output Summary (Last 24 hours) at 2/6/2024 0813  Last data filed at 2/6/2024 0000  Gross per 24 hour   Intake 1755 ml   Output 1100 ml   Net 655 ml       Vital signs:  Blood pressure 125/76, pulse 73, temperature 97.7 °F (36.5 °C), temperature source Oral, resp. rate 16, height 5' 9\" (1.753 m), weight 222 lb 9.6 oz (101 kg), SpO2 94%.    Physical Exam:     left Leg:  Dressing: clean and dry  Able to dorsiflex ankle and move toes  Sensation grossly intact to light touch throughout  Distal pulses intact  No calf swelling  Ling's sign negative  Compartments soft  No signs or symptoms of DVT or compartment syndrome      Labs:   Lab Results   Component Value Date    CREATSERUM 0.86 02/05/2024    BUN 9 02/05/2024     02/05/2024    K 3.8 02/05/2024    CL 97 02/05/2024    CO2 31.0 02/05/2024     02/05/2024    CA 9.3 02/05/2024         Assessment: 58 year old  male POD #1 left CALISTA    Plan:    Akanksha-op Glucose Goal control <140  TEDs, SCDs, IS  mobilize with PT, WBAT on operative leg  pain controlled with meds  According to CHEST and AAOS guidelines, ASA EC 81 mg po bid for DVT prophylaxis due to bleeding concerns  Disposition: plan discharge today if doing well with PT, consider home vs rehab if needed    Follow up  with Dr. Moura in 2 weeks    Followed by Physician group for medical conditions to include Active Problems:    * No active hospital problems. *        Signed:  Keshawn Moura MD  2/6/2024  8:13 AM

## (undated) DEVICE — GLOVE SUR 7 PROTEXIS PI PIP CRM PWD F

## (undated) DEVICE — PENCIL ES BTTN SWCH W/ TIP HOLSTER E-Z CLN

## (undated) DEVICE — DRAPE INCIS W17XL23IN FAB ANTIMIC FULL W HNDL

## (undated) DEVICE — SUTURE STRATAFIX SYMMTRC PDS + SZ 1 L18IN

## (undated) DEVICE — SYRINGE MED 30ML STD CLR PLAS LL TIP N CTRL

## (undated) DEVICE — POUCH IRRIG 19X23IN TRNSLUC MATTE FNSH

## (undated) DEVICE — DRAPE SUR W70XL100IN STD SMS POLYPR FULL SHT

## (undated) DEVICE — SUTURE VCRL SZ 2-0 L27IN ABSRB UD L36MM CP-1

## (undated) DEVICE — SUTURE MCRYL SZ 3-0 L27IN ABSRB UD L19MM PS-2

## (undated) DEVICE — PACK ANTERIOR HIP

## (undated) DEVICE — BANDAGE COHESIVE W4INXL5YD TAN E POR SLF ADH

## (undated) DEVICE — LIGHT HANDLE

## (undated) DEVICE — NEEDLE SPNL 18GA L3.5IN PNK HUB SS RW FIT

## (undated) DEVICE — SUTURE VCRL SZ 0 L27IN ABSRB UD L36MM CP-1

## (undated) DEVICE — DRAPE TOP 102X53IN ABSRB REINF HK AND LOOP LN

## (undated) DEVICE — BLADE SAW 19X90X1.27G

## (undated) DEVICE — WRAP HIP COMPR

## (undated) DEVICE — SURGICAL GLV PROTEXIS PI 6.5.

## (undated) DEVICE — SURGICAL GLOVE PI ORTHO 7.5

## (undated) DEVICE — GLOVE SUR 8 PROTEXIS PIP CRM PWD F

## (undated) DEVICE — SUTURE FIBERWIRE SZ 2 L38IN NONABSORB BLU

## (undated) DEVICE — HOOD STERI-SHIELD 408-800-100

## (undated) DEVICE — SOLUTION RUBBING 4OZ 70% ISO ALC CLR

## (undated) DEVICE — Device

## (undated) DEVICE — SOLUTION IRRIG 3000ML 0.9% NACL FLX CONT

## (undated) DEVICE — SPONGE GZ 4XL4IN 100% COT 12 PLY TYP VII WVN

## (undated) DEVICE — SRG GLV PROTEXIS BLUE 7

## (undated) DEVICE — POUCH INSTR W11XL7IN TRNSPAR PLAS 2 COMPT

## (undated) DEVICE — SEALER BPLR ELECTRD L5.74MM DIA3.48MM CNTR

## (undated) NOTE — LETTER
OUTSIDE TESTING RESULT REQUEST     IMPORTANT: FOR YOUR IMMEDIATE ATTENTION  Please FAX all test results listed below to: [phone]     Testing already done on or about: 24 @ 0610     * * * * If testing is NOT complete, arrange with patient A.S.A.P. * * * *      Patient Name: Demarcus Jaramillo  Surgery Date: 2024  Medical Record: [id number]  CSN: [id number]  : 10/29/1965 - A: 58 y     Sex: male  Surgeon(s):  Keshawn Moura MD  Procedure: LEFT ANTERIOR TOTAL HIP ARTHROPLASTY  Anesthesia Type: Spinal     Surgeon: Keshawn Moura MD     The following Testing and Time Line are REQUIRED PER ANESTHESIA     EKG READ AND SIGNED WITHIN   90 days  CBC, Platelet; NO Differential within  90 days  BMP (requires 4 hour fast) within  90 days  MSSA/MRSA Nasal screening within 30 days        Thank You,     Sent by: Angélica Hoff RN